# Patient Record
Sex: FEMALE | Race: WHITE | NOT HISPANIC OR LATINO | Employment: FULL TIME | URBAN - METROPOLITAN AREA
[De-identification: names, ages, dates, MRNs, and addresses within clinical notes are randomized per-mention and may not be internally consistent; named-entity substitution may affect disease eponyms.]

---

## 2018-09-24 ENCOUNTER — OFFICE VISIT (OUTPATIENT)
Dept: ORTHOPEDIC SURGERY | Facility: CLINIC | Age: 48
End: 2018-09-24

## 2018-09-24 VITALS — OXYGEN SATURATION: 100 % | HEIGHT: 64 IN | BODY MASS INDEX: 30.73 KG/M2 | WEIGHT: 180 LBS | HEART RATE: 98 BPM

## 2018-09-24 DIAGNOSIS — M21.6X2 ACQUIRED METATARSUS VARUS OF LEFT FOOT: ICD-10-CM

## 2018-09-24 DIAGNOSIS — Q66.90 CONGENITAL DEFORMITY OF FOOT: ICD-10-CM

## 2018-09-24 DIAGNOSIS — M79.672 LEFT FOOT PAIN: Primary | ICD-10-CM

## 2018-09-24 PROCEDURE — 99203 OFFICE O/P NEW LOW 30 MIN: CPT | Performed by: ORTHOPAEDIC SURGERY

## 2018-09-24 RX ORDER — CITALOPRAM 20 MG/1
TABLET ORAL
COMMUNITY
Start: 2018-08-03

## 2018-09-24 RX ORDER — PRAVASTATIN SODIUM 10 MG
10 TABLET ORAL DAILY
COMMUNITY

## 2018-09-24 RX ORDER — MONTELUKAST SODIUM 10 MG/1
TABLET ORAL
COMMUNITY
Start: 2018-07-02

## 2018-09-24 RX ORDER — BUPROPION HYDROCHLORIDE 100 MG/1
100 TABLET ORAL DAILY
COMMUNITY

## 2018-09-24 RX ORDER — CETIRIZINE HYDROCHLORIDE 10 MG/1
10 TABLET ORAL DAILY
COMMUNITY

## 2018-09-24 RX ORDER — METOPROLOL SUCCINATE 25 MG/1
TABLET, EXTENDED RELEASE ORAL
COMMUNITY
Start: 2018-07-06

## 2018-09-24 RX ORDER — RANITIDINE 150 MG/1
150 TABLET ORAL DAILY
COMMUNITY

## 2018-09-24 NOTE — PROGRESS NOTES
NEW PATIENT    Patient: Shaista Mujica  : 1970    Primary Care Provider: Rema Munguia    Requesting Provider: As above    Pain of the Left Foot      History    Chief Complaint: Left bunion    History of Present Illness: This is an extremely pleasant 48-year-old woman here for evaluation of a bunion on the left foot.  She reports that it really doesn't hurt, at most she rates the pain as 1 out of 10, occasional aching.  She wanted to have it evaluated, to make sure that she was not doing anything to make it worse.  She does not know if she has any family history of bunions, her parents are .  She has not really noted that the left foot has some mild lateral curve.  The bunion has developed over the last several years.  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    No current outpatient prescriptions on file prior to visit.     No current facility-administered medications on file prior to visit.       Allergies   Allergen Reactions   • Penicillins Rash      Past Medical History:   Diagnosis Date   • Acid reflux    • High cholesterol    • Hypertension      Past Surgical History:   Procedure Laterality Date   • D&C AND LAPAROSCOPY     • MOUTH SURGERY      root canal & wisdom teeth removal     Family History   Problem Relation Age of Onset   • Diabetes Father    • Heart attack Father       Social History     Social History   • Marital status: Single     Spouse name: N/A   • Number of children: N/A   • Years of education: N/A     Occupational History   • Not on file.     Social History Main Topics   • Smoking status: Never Smoker   • Smokeless tobacco: Never Used   • Alcohol use Yes      Comment: twice a year   • Drug use: No   • Sexual activity: Defer     Other Topics Concern   • Not on file     Social History Narrative   • No narrative on file        Review of Systems   Constitutional: Negative.    HENT:  "Positive for postnasal drip, sinus pressure and sneezing.    Eyes: Positive for itching.   Respiratory: Negative.    Cardiovascular: Negative.    Gastrointestinal: Negative.    Endocrine: Negative.    Genitourinary: Negative.    Musculoskeletal: Positive for arthralgias and back pain.   Skin: Negative.    Allergic/Immunologic: Positive for environmental allergies.   Neurological: Positive for dizziness.   Hematological: Negative.    Psychiatric/Behavioral: The patient is nervous/anxious.        The following portions of the patient's history were reviewed and updated as appropriate: allergies, current medications, past family history, past medical history, past social history, past surgical history and problem list.    Physical Exam:   Pulse 98   Ht 162.6 cm (64\")   Wt 81.6 kg (180 lb)   SpO2 100%   BMI 30.90 kg/m²   GENERAL: Body habitus: overweight    Lower extremity edema: Left: none; Right: none    Varicose veins:  Left: none; Right: none    Gait: normal     Mental Status:  awake and alert; oriented to person, place, and time    Voice:  clear  SKIN:  Normal and warm and dry    Hair Growth:  Right:normal; Left:  normal  NAILS: Toenails: normal  HEENT: Head: Normocephalic, atraumatic,  without obvious abnormality.  eye: normal external eye, no icterus  ears: normal external ears  nose: normal external nose  pharynx: dental hygiene adequate  PULM:  Repiratory effort normal  CV:  Dorsalis Pedis:  Right: 2+; Left:2+    Posterior Tibial: Right:2+; Left:2+    Capillary Refill:  Brisk  MSK:  Hand:left handed and sensation intact      Tibia:  Right:  non tender; Left:  non tender      Ankle:  Right: non tender, ROM  normal and symmetric and motor function  normal; Left:  non tender, ROM  normal and symmetric and motor function  normal      Foot:  Right:  non tender, ROM  normal and motor function  normal; Left:  She has moderate hallux valgus metatarsus primus varus, but no tenderness over the bunion.  She has " moderate metatarsus adductus, heel bisector goes through the third toe, no tenderness throughout the foot, normal range of motion      NEURO: Heel Walking:  Right:  normal; Left:  normal    Toe Walking:  Right:  normal; Left:  normal     [facility] 5.07 monofilament test: normal    Lower extremity sensation: intact     Reflexes:  Biceps:  Right:  1+; Left:  1+           Quads:  Right:  2+; Left:  2+           Ankle:  Right:  1+; Left:  1+      Calf Atrophy:none    Motor Function: all 5/5         Medical Decision Making    Data Review:   ordered and reviewed x-rays today    Assessment and Plan/ Diagnosis/Treatment options:   1.  Congenital deformity of foot  She has moderate metatarsus adductus, and I explained this to her.  I explained its a congenital abnormality, there is nothing that she or her parents could have done to change this.  I explained that it makes her high risk to develop a bunion.  I drew a picture on her foot explaining the angles.  I explained there is no brace that will change the bunion.  She does not wear narrow toed high heels, she wears good quality comfortable shoes.  We discussed various brands.  I explained that pain is the only indication for surgery.  Given that it does not hurt, I would not recommend surgical intervention.  I explained that the bunion might never hurt enough for surgery, we cannot predict it.  She was relieved, she would like to avoid surgery if possible.  We discussed all of the above in detail, I'll be happy to see her any time.  One thing I would recommend in her occupation, is to wear knee-high support stockings every day, and I explained where to find them and what type.  She takes 24-hour call in her job, and the support stockings can help her feel better.  2. Acquired metatarsus varus of left foot  This is a result of the overall metatarsus adductus